# Patient Record
Sex: MALE | Race: BLACK OR AFRICAN AMERICAN
[De-identification: names, ages, dates, MRNs, and addresses within clinical notes are randomized per-mention and may not be internally consistent; named-entity substitution may affect disease eponyms.]

---

## 2021-05-18 ENCOUNTER — HOSPITAL ENCOUNTER (EMERGENCY)
Dept: HOSPITAL 35 - ER | Age: 26
Discharge: HOME | End: 2021-05-18
Payer: COMMERCIAL

## 2021-05-18 VITALS — SYSTOLIC BLOOD PRESSURE: 123 MMHG | DIASTOLIC BLOOD PRESSURE: 93 MMHG

## 2021-05-18 VITALS — BODY MASS INDEX: 27.4 KG/M2 | WEIGHT: 185.01 LBS | HEIGHT: 69 IN

## 2021-05-18 DIAGNOSIS — E03.9: ICD-10-CM

## 2021-05-18 DIAGNOSIS — R07.89: Primary | ICD-10-CM

## 2021-05-18 LAB
ANION GAP SERPL CALC-SCNC: 10 MMOL/L (ref 7–16)
BASOPHILS NFR BLD AUTO: 1.5 % (ref 0–2)
BUN SERPL-MCNC: 14 MG/DL (ref 7–18)
CALCIUM SERPL-MCNC: 9.1 MG/DL (ref 8.5–10.1)
CHLORIDE SERPL-SCNC: 101 MMOL/L (ref 98–107)
CO2 SERPL-SCNC: 29 MMOL/L (ref 21–32)
CREAT SERPL-MCNC: 1.1 MG/DL (ref 0.7–1.3)
EOSINOPHIL NFR BLD: 2.4 % (ref 0–3)
ERYTHROCYTE [DISTWIDTH] IN BLOOD BY AUTOMATED COUNT: 13.5 % (ref 10.5–14.5)
GLUCOSE SERPL-MCNC: 111 MG/DL (ref 74–106)
GRANULOCYTES NFR BLD MANUAL: 50 % (ref 36–66)
HCT VFR BLD CALC: 47 % (ref 42–52)
HGB BLD-MCNC: 15.7 GM/DL (ref 14–18)
LYMPHOCYTES NFR BLD AUTO: 34.3 % (ref 24–44)
MCH RBC QN AUTO: 27.7 PG (ref 26–34)
MCHC RBC AUTO-ENTMCNC: 33.3 G/DL (ref 28–37)
MCV RBC: 83 FL (ref 80–100)
MONOCYTES NFR BLD: 11.8 % (ref 1–8)
NEUTROPHILS # BLD: 1.7 THOU/UL (ref 1.4–8.2)
PLATELET # BLD: 229 THOU/UL (ref 150–400)
POTASSIUM SERPL-SCNC: 3.7 MMOL/L (ref 3.5–5.1)
RBC # BLD AUTO: 5.66 MIL/UL (ref 4.5–6)
SODIUM SERPL-SCNC: 140 MMOL/L (ref 136–145)
TROPONIN I SERPL-MCNC: <0.06 NG/ML (ref ?–0.06)
WBC # BLD AUTO: 3.3 THOU/UL (ref 4–11)

## 2021-05-18 NOTE — EKG
[address]  [address]
Phone:  [phone]                    ELECTROCARDIOGRAM REPORT      
_______________________________________________________________________________
 
Name:       MARLON JACKSONREYNOLD                Room #:                     [facility]  
STEVAN#:      [id number]     Account #:      [id number]  
Admission:  21    Attend Phys:                          
Discharge:  21    Date of Birth:  [birth year]  
                                                          Report #: [id number]
   [id number]
_______________________________________________________________________________
                         [facility] ED
                                       
Test Date:    2021               Test Time:    11:07:59
Pat Name:     ELAINE JACKSON           Department:   
Patient ID:   [id number]            Room:          
Gender:                               Technician:   
:          1995               Requested By: Thad Patel
Order Number: [id number] MD:   Roman Diaz
                                 Measurements
Intervals                              Axis          
Rate:         70                       P:            -11
VA:           167                      QRS:          60
QRSD:         79                       T:            34
QT:           354                                    
QTc:          382                                    
                           Interpretive Statements
Sinus rhythm
No previous ECG available for comparison
Electronically Signed On 2021 13:08:59 CDT by Roman Diaz
[email or web address]
 
 
 
 
 
 
 
 
 
 
 
 
 
 
 
 
 
 
 
 
 
 
 
  <ELECTRONICALLY SIGNED>
   By: Roman Diaz MD        
  21     1308
D: 21 1107                           _____________________________________
T: 21 1107                           Roman Diaz MD          /EPI